# Patient Record
Sex: FEMALE | Race: OTHER
[De-identification: names, ages, dates, MRNs, and addresses within clinical notes are randomized per-mention and may not be internally consistent; named-entity substitution may affect disease eponyms.]

---

## 2017-05-19 ENCOUNTER — HOSPITAL ENCOUNTER (OUTPATIENT)
Dept: HOSPITAL 25 - OR | Age: 52
Discharge: HOME | End: 2017-05-19
Attending: OTOLARYNGOLOGY
Payer: COMMERCIAL

## 2017-05-19 VITALS — SYSTOLIC BLOOD PRESSURE: 130 MMHG | DIASTOLIC BLOOD PRESSURE: 79 MMHG

## 2017-05-19 DIAGNOSIS — F17.210: ICD-10-CM

## 2017-05-19 DIAGNOSIS — J38.1: Primary | ICD-10-CM

## 2017-05-19 DIAGNOSIS — R00.1: ICD-10-CM

## 2017-05-19 PROCEDURE — 88305 TISSUE EXAM BY PATHOLOGIST: CPT

## 2017-05-20 NOTE — OP
DATE OF OPERATION:  05/19/17 - [facility]

 

DATE OF BIRTH:  [birth year]

 

SURGEON:  Michael Guerra MD



ANESTHESIOLOGIST:  Miki Cuba MD

 

ANESTHESIA:  General endotracheal anesthesia with a laser-safe tube.

 

PRE-OP DIAGNOSIS:  Neoplasm, vocal cord, behavior undetermined.

 

POST-OP DIAGNOSIS:  Neoplasm, vocal cord, behavior undetermined.  However, they 
looked like benign polyps.

 

OPERATIVE PROCEDURE:  Microlaryngoscopy with excision of both bilateral vocal 
cord masses followed by CO2 laser ablation on their bases

 

SPECIMENS:  Bilateral vocal cord masses.

 

DESCRIPTION OF PROCEDURE:  The patient was taken to the operating room and 
placed in the supine position on the operating table.  General anesthesia was 
induced and she was orotracheally intubated with a laser-safe tube.  During the 
procedure, wet towels and the _____ were used.  She was turned and draped for 
the surgery.  Laryngoscope was inserted, suspended from the suspension system.  
Microscope brought in.  She had some polypoid masses on her anterior vocal 
cords just posterior to the anterior commissure and these appeared to be 
polypoid and consistent with polypoid degeneration or smokers polyps and not 
squamous cell carcinoma.  I used pledgets impregnated with oxymetazoline and 4% 
lidocaine to anesthetize the area.  Using cup biopsy forceps, _____ was debrided
, I then used the CO2 laser at power setting of 4 to ablate the bases 
bilaterally.  The patient tolerated this well.  No complications.  He was 
extubated uneventfully.

 

 [id number] #: [id number]

MTDD

## 2018-03-19 ENCOUNTER — HOSPITAL ENCOUNTER (EMERGENCY)
Dept: HOSPITAL 25 - ED | Age: 53
LOS: 1 days | Discharge: ANOTHER HOSPITAL | End: 2018-03-20
Payer: COMMERCIAL

## 2018-03-19 DIAGNOSIS — F17.210: ICD-10-CM
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
## 2018-03-19 DIAGNOSIS — R45.851: Primary | ICD-10-CM

## 2018-03-19 DIAGNOSIS — Z88.0: ICD-10-CM

## 2018-03-19 DIAGNOSIS — J44.9: ICD-10-CM

## 2018-03-19 DIAGNOSIS — F32.9: ICD-10-CM

## 2018-03-19 DIAGNOSIS — F41.9: ICD-10-CM

## 2018-03-19 DIAGNOSIS — F10.121: ICD-10-CM

## 2018-03-19 LAB
BASOPHILS # BLD AUTO: 0.1 10^3/UL (ref 0–0.2)
EOSINOPHIL # BLD AUTO: 0.2 10^3/UL (ref 0–0.6)
HCT VFR BLD AUTO: 42 % (ref 35–47)
HGB BLD-MCNC: 14.8 G/DL (ref 12–16)
LYMPHOCYTES # BLD AUTO: 4.2 10^3/UL (ref 1–4.8)
MCH RBC QN AUTO: 34 PG (ref 27–31)
MCHC RBC AUTO-ENTMCNC: 35 G/DL (ref 31–36)
MCV RBC AUTO: 96 FL (ref 80–97)
MONOCYTES # BLD AUTO: 0.6 10^3/UL (ref 0–0.8)
NEUTROPHILS # BLD AUTO: 4.6 10^3/UL (ref 1.5–7.7)
NRBC # BLD AUTO: 0 10^3/UL
NRBC BLD QL AUTO: 0.1
PLATELET # BLD AUTO: 282 10^3/UL (ref 150–450)
RBC # BLD AUTO: 4.4 10^6/UL (ref 4–5.4)
WBC # BLD AUTO: 9.6 10^3/UL (ref 3.5–10.8)

## 2018-03-19 PROCEDURE — G0480 DRUG TEST DEF 1-7 CLASSES: HCPCS

## 2018-03-19 PROCEDURE — 80329 ANALGESICS NON-OPIOID 1 OR 2: CPT

## 2018-03-19 PROCEDURE — 85025 COMPLETE CBC W/AUTO DIFF WBC: CPT

## 2018-03-19 PROCEDURE — 36415 COLL VENOUS BLD VENIPUNCTURE: CPT

## 2018-03-19 PROCEDURE — 81003 URINALYSIS AUTO W/O SCOPE: CPT

## 2018-03-19 PROCEDURE — 84443 ASSAY THYROID STIM HORMONE: CPT

## 2018-03-19 PROCEDURE — 87086 URINE CULTURE/COLONY COUNT: CPT

## 2018-03-19 PROCEDURE — 80346 BENZODIAZEPINES1-12: CPT

## 2018-03-19 PROCEDURE — 80053 COMPREHEN METABOLIC PANEL: CPT

## 2018-03-19 PROCEDURE — 99283 EMERGENCY DEPT VISIT LOW MDM: CPT

## 2018-03-19 PROCEDURE — 80320 DRUG SCREEN QUANTALCOHOLS: CPT

## 2018-03-19 PROCEDURE — 81015 MICROSCOPIC EXAM OF URINE: CPT

## 2018-03-19 NOTE — ED
Estiven DUBOSE Angela, scribed for Frankie Sanchez MD on 03/19/18 at 1736 .





Psychiatric Complaint





- HPI Summary


HPI Summary: 





Pt is a 53 y/o female presenting to [facility] via police officers for a 9.41. Pt 

was brought by the  for alcohol intoxication and voicing suicidal 

ideation thoughts. When asked why the pt is here, she states "I can't take it 

anymore." 


Pt has hx of chronic alcohol use.


Per nurse's note, pt admits to drinking 1-2 pints of Vodka and drinking beer.  

Patient called 911 indicating to them she does not want to live anymore. 








HPI IS LIMITED DUE TO LEVEL 5 CAVEAT - alcohol intoxication.





- History Of Current Complaint


Time Seen by Provider: 03/19/18 17:16


Hx Obtained From: Patient, Other: - Police


Hx From Patient Unobtainable Due To: Other - level 5 caveat - alcohol 

intoxication


Onset/Duration: Lasting Days, Still Present


Timing: Days


Severity Currently: Severe


Character: Depressed


Aggravating Factor(s): Alcohol Use


Alleviating Factor(s): Nothing


Associated Signs And Symptoms: Positive: Confused


Has Suicidal: Reports: Thoughts





- Allergies/Home Medications


Allergies/Adverse Reactions: 


 Allergies











Allergy/AdvReac Type Severity Reaction Status Date / Time


 


Penicillins Allergy  Difficulty Verified 03/19/18 17:19





   Breathing/Wheezing  











Home Medications: 


 Home Medications





Mometasone/Formoter 200/5 MDI* [Dulera 200/5 MDI*] 2 puff INH BID 03/19/18 [

History Confirmed 03/19/18]











PMH/Surg Hx/FS Hx/Imm Hx


Cardiovascular History: Reports: Other Cardiovascular Problems/Disorders - 

occassion bradycardic high 40's


Respiratory History: Reports: Hx Chronic Obstructive Pulmonary Disease (COPD) - 

albuterol inhaler use rarely used


Musculoskeletal History: Reports: Hx Arthritis - left ankle from previous injury


Sensory History: Reports: Hx Contacts or Glasses


   Denies: Hx Hearing Aid


Opthamlomology History: Reports: Hx Contacts or Glasses


Psychiatric History: Reports: Hx Anxiety, Hx Depression





- Surgical History


Surgery Procedure, Year, and Place: left ankle surgery 9 years ago.  polyps 

removed from throat many years ago.  tubal ligation


Hx Anesthesia Reactions: No


Infectious Disease History: No


Infectious Disease History: 


   Denies: Traveled Outside the [place] in Last 30 Days





- Family History


Known Family History: Positive: Unknown - due to level 5 caveat - alcohol 

intoxication





- Social History


Alcohol Use: Daily


Substance Use Type: Reports: None, Marijuana


Smoking Status (MU): Heavy Every Day Tobacco Smoker


Type: Cigarettes


Amount Used/How Often: 1/2 ppd smoked for 35 years





Review of Systems





- ROS Summary


Review of Systems Summary: 





ROS IS LIMITED DUE TO LEVEL 5 CAVEAT - pt is intoxicated with alcohol


Constitutional: Other - pt is intoxicated with alcohol


Negative: Fever


Psychological: Other - SI


All Other Systems Reviewed And Are Negative: No





Physical Exam





- Summary


Physical Exam Summary: 





General: well-appearing, no pain distress


Skin: warm, color reflects adequate perfusion, dry


Head: normal


Eyes: EOMI, MOISES


ENT: normal


Neck: supple, nontender


Respiratory: CTA, breath sounds present


Cardiovascular: RRR


Abdomen: soft, nontender


Bowel: present


Musculoskeletal: normal, strength/ROM intact


Neurological: normal, sensory/motor intact, A&O x3


Psychological: affect/mood appropriate


Triage Information Reviewed: Yes


Vital Signs On Initial Exam: 


 Initial Vitals











Temp Pulse Resp BP Pulse Ox


 


 98.2 F   92   17   119/50   96 


 


 03/19/18 17:13  03/19/18 17:13  03/19/18 17:13  03/19/18 17:13  03/19/18 17:13











Vital Signs Reviewed: Yes


Completion Of Physical Exam Limited Due To: Level 5 - due to alcohol 

intoxication





Diagnostics





- Vital Signs


 Vital Signs











  Temp Pulse Resp BP Pulse Ox


 


 03/19/18 17:13  98.2 F  92  17  119/50  96














- Laboratory


Lab Results: 


 Lab Results











  03/19/18 03/19/18 03/19/18 Range/Units





  17:30 17:59 17:59 


 


WBC    9.6  (3.5-10.8)  10^3/ul


 


RBC    4.40  (4.0-5.4)  10^6/ul


 


Hgb    14.8  (12.0-16.0)  g/dl


 


Hct    42  (35-47)  %


 


MCV    96  (80-97)  fL


 


MCH    34 H  (27-31)  pg


 


MCHC    35  (31-36)  g/dl


 


RDW    14  (10.5-15)  %


 


Plt Count    282  (150-450)  10^3/ul


 


MPV    7 L  (7.4-10.4)  um3


 


Neut % (Auto)    47.5  (38-83)  %


 


Lymph % (Auto)    43.9  (25-47)  %


 


Mono % (Auto)    6.1  (0-7)  %


 


Eos % (Auto)    1.8  (0-6)  %


 


Baso % (Auto)    0.7  (0-2)  %


 


Absolute Neuts (auto)    4.6  (1.5-7.7)  10^3/ul


 


Absolute Lymphs (auto)    4.2  (1.0-4.8)  10^3/ul


 


Absolute Monos (auto)    0.6  (0-0.8)  10^3/ul


 


Absolute Eos (auto)    0.2  (0-0.6)  10^3/ul


 


Absolute Basos (auto)    0.1  (0-0.2)  10^3/ul


 


Absolute Nucleated RBC    0  10^3/ul


 


Nucleated RBC %    0.1  


 


Sodium   142   (133-145)  mmol/L


 


Potassium   3.6   (3.5-5.0)  mmol/L


 


Chloride   110   (101-111)  mmol/L


 


Carbon Dioxide   23   (22-32)  mmol/L


 


Anion Gap   9   (2-11)  mmol/L


 


BUN   11   (6-24)  mg/dL


 


Creatinine   0.65   (0.51-0.95)  mg/dL


 


Est GFR ( Amer)   122.6   (>60)  


 


Est GFR (Non-Af Amer)   95.3   (>60)  


 


BUN/Creatinine Ratio   16.9   (8-20)  


 


Glucose   85   ()  mg/dL


 


Calcium   9.1   (8.6-10.3)  mg/dL


 


Total Bilirubin   0.40   (0.2-1.0)  mg/dL


 


AST   63 H   (13-39)  U/L


 


ALT   76 H   (7-52)  U/L


 


Alkaline Phosphatase   85   ()  U/L


 


Total Protein   7.4   (6.4-8.9)  g/dL


 


Albumin   4.3   (3.2-5.2)  g/dL


 


Globulin   3.1   (2-4)  g/dL


 


Albumin/Globulin Ratio   1.4   (1-3)  


 


TSH   1.15   (0.34-5.60)  mcIU/mL


 


Urine Color  Colorless    


 


Urine Appearance  Clear    


 


Urine pH  5.0    (5-9)  


 


Ur Specific Gravity  1.003 L    (1.010-1.030)  


 


Urine Protein  Negative    (Negative)  


 


Urine Ketones  Negative    (Negative)  


 


Urine Blood  1+ A    (Negative)  


 


Urine Nitrate  Negative    (Negative)  


 


Urine Bilirubin  Negative    (Negative)  


 


Urine Urobilinogen  Negative    (Negative)  


 


Ur Leukocyte Esterase  Negative    (Negative)  


 


Urine WBC (Auto)  Trace(0-5/hpf)    (Absent)  


 


Urine RBC (Auto)  Trace(0-2/hpf)    (Absent)  


 


Ur Squamous Epith Cells  Present A    (Absent)  


 


Urine Bacteria  Absent    (Absent)  


 


Urine Glucose  Negative    (Negative)  


 


Salicylates   < 2.50   (<30)  mg/dL


 


Acetaminophen   < 15   mcg/mL


 


Serum Alcohol   398 H   (<10)  mg/dL











Result Diagrams: 


 03/19/18 17:59





 03/19/18 17:59


Lab Statement: Any lab studies that have been ordered have been reviewed, and 

results considered in the medical decision making process.





Course/Dx





- Course


Course Of Treatment: Medications reviewed. Allergies noted. Test results show 

serum alcohol of 398. Pt is awaiting alcohol metabolism and medical clearance 

for MHE. Pt will be signed out to Dr. Ohara, pending disposition, awaiting 

medical clearance and MHE.





- Differential Dx/Clinical Impression


Provider Diagnosis: 


 Mental health problem, Acute alcohol intoxication








Discharge





- Discharge Plan


Condition: Stable


Disposition: OTHER


Discharge Disposition Comment: signed out to Dr. Ohara, pending dispo, awaiting 

alcohol metabolism and MHE


Referrals: 


Jair Urena MD [Primary Care Provider] - 





The documentation as recorded by the Estiven bradford Angela accurately reflects 

the service I personally performed and the decisions made by me, Frankie Sanchez MD.

## 2018-03-20 VITALS — SYSTOLIC BLOOD PRESSURE: 122 MMHG | DIASTOLIC BLOOD PRESSURE: 59 MMHG
